# Patient Record
Sex: FEMALE | Race: OTHER | HISPANIC OR LATINO
[De-identification: names, ages, dates, MRNs, and addresses within clinical notes are randomized per-mention and may not be internally consistent; named-entity substitution may affect disease eponyms.]

---

## 2023-10-16 ENCOUNTER — APPOINTMENT (OUTPATIENT)
Dept: ORTHOPEDIC SURGERY | Facility: CLINIC | Age: 48
End: 2023-10-16
Payer: COMMERCIAL

## 2023-10-16 VITALS — BODY MASS INDEX: 27.46 KG/M2 | WEIGHT: 155 LBS | HEIGHT: 63 IN

## 2023-10-16 DIAGNOSIS — Z78.9 OTHER SPECIFIED HEALTH STATUS: ICD-10-CM

## 2023-10-16 PROCEDURE — 99204 OFFICE O/P NEW MOD 45 MIN: CPT

## 2023-10-16 PROCEDURE — 73562 X-RAY EXAM OF KNEE 3: CPT | Mod: 50

## 2023-10-19 ENCOUNTER — APPOINTMENT (OUTPATIENT)
Dept: ORTHOPEDIC SURGERY | Facility: CLINIC | Age: 48
End: 2023-10-19
Payer: COMMERCIAL

## 2023-10-19 PROCEDURE — 99214 OFFICE O/P EST MOD 30 MIN: CPT

## 2023-10-19 PROCEDURE — 72170 X-RAY EXAM OF PELVIS: CPT

## 2023-10-19 PROCEDURE — 72110 X-RAY EXAM L-2 SPINE 4/>VWS: CPT

## 2023-10-30 ENCOUNTER — APPOINTMENT (OUTPATIENT)
Dept: ORTHOPEDIC SURGERY | Facility: CLINIC | Age: 48
End: 2023-10-30
Payer: COMMERCIAL

## 2023-10-30 PROCEDURE — 99214 OFFICE O/P EST MOD 30 MIN: CPT

## 2023-10-30 PROCEDURE — 95864 NEEDLE EMG 4 EXTREMITIES: CPT

## 2023-10-30 PROCEDURE — 73110 X-RAY EXAM OF WRIST: CPT | Mod: 50

## 2023-11-08 ENCOUNTER — APPOINTMENT (OUTPATIENT)
Dept: NEUROLOGY | Facility: CLINIC | Age: 48
End: 2023-11-08
Payer: COMMERCIAL

## 2023-11-08 PROCEDURE — 95886 MUSC TEST DONE W/N TEST COMP: CPT

## 2023-11-08 PROCEDURE — 95912 NRV CNDJ TEST 11-12 STUDIES: CPT

## 2023-11-10 ENCOUNTER — APPOINTMENT (OUTPATIENT)
Dept: ORTHOPEDIC SURGERY | Facility: CLINIC | Age: 48
End: 2023-11-10
Payer: COMMERCIAL

## 2023-11-10 VITALS — WEIGHT: 155 LBS | BODY MASS INDEX: 27.46 KG/M2 | HEIGHT: 63 IN

## 2023-11-10 DIAGNOSIS — M25.532 PAIN IN RIGHT WRIST: ICD-10-CM

## 2023-11-10 DIAGNOSIS — M25.531 PAIN IN RIGHT WRIST: ICD-10-CM

## 2023-11-10 DIAGNOSIS — Z86.018 PERSONAL HISTORY OF OTHER BENIGN NEOPLASM: ICD-10-CM

## 2023-11-10 PROCEDURE — 72050 X-RAY EXAM NECK SPINE 4/5VWS: CPT

## 2023-11-10 PROCEDURE — 99214 OFFICE O/P EST MOD 30 MIN: CPT

## 2023-11-10 RX ORDER — METHYLPREDNISOLONE 4 MG/1
4 TABLET ORAL
Qty: 1 | Refills: 0 | Status: ACTIVE | COMMUNITY
Start: 2023-11-10 | End: 1900-01-01

## 2023-11-15 ENCOUNTER — APPOINTMENT (OUTPATIENT)
Dept: MRI IMAGING | Facility: CLINIC | Age: 48
End: 2023-11-15
Payer: COMMERCIAL

## 2023-11-15 PROCEDURE — 73221 MRI JOINT UPR EXTREM W/O DYE: CPT | Mod: RT

## 2023-11-15 PROCEDURE — 72141 MRI NECK SPINE W/O DYE: CPT

## 2023-11-20 ENCOUNTER — APPOINTMENT (OUTPATIENT)
Dept: ORTHOPEDIC SURGERY | Facility: CLINIC | Age: 48
End: 2023-11-20
Payer: COMMERCIAL

## 2023-11-20 DIAGNOSIS — R20.0 ANESTHESIA OF SKIN: ICD-10-CM

## 2023-11-20 PROCEDURE — 99213 OFFICE O/P EST LOW 20 MIN: CPT

## 2023-11-29 ENCOUNTER — APPOINTMENT (OUTPATIENT)
Dept: ORTHOPEDIC SURGERY | Facility: CLINIC | Age: 48
End: 2023-11-29
Payer: COMMERCIAL

## 2023-11-29 VITALS — BODY MASS INDEX: 27.46 KG/M2 | WEIGHT: 155 LBS | HEIGHT: 63 IN

## 2023-11-29 DIAGNOSIS — S63.501D UNSPECIFIED SPRAIN OF RIGHT WRIST, SUBSEQUENT ENCOUNTER: ICD-10-CM

## 2023-11-29 PROCEDURE — 99214 OFFICE O/P EST MOD 30 MIN: CPT

## 2023-11-30 ENCOUNTER — APPOINTMENT (OUTPATIENT)
Dept: ORTHOPEDIC SURGERY | Facility: CLINIC | Age: 48
End: 2023-11-30

## 2023-12-18 ENCOUNTER — APPOINTMENT (OUTPATIENT)
Dept: ORTHOPEDIC SURGERY | Facility: CLINIC | Age: 48
End: 2023-12-18
Payer: COMMERCIAL

## 2023-12-18 VITALS — HEIGHT: 63 IN | WEIGHT: 155 LBS | BODY MASS INDEX: 27.46 KG/M2

## 2023-12-18 DIAGNOSIS — M22.42 CHONDROMALACIA PATELLAE, LEFT KNEE: ICD-10-CM

## 2023-12-18 DIAGNOSIS — M22.41 CHONDROMALACIA PATELLAE, RIGHT KNEE: ICD-10-CM

## 2023-12-18 PROCEDURE — 99213 OFFICE O/P EST LOW 20 MIN: CPT

## 2023-12-18 NOTE — IMAGING
[Bilateral] : knee bilaterally [All Views] : anteroposterior, lateral, skyline, and anteroposterior standing [Mild patellofemoral OA] : Mild patellofemoral OA [de-identified] :   ----------------------------------------------------------------------------   Bilateral knee exam:   Skin: no significant pertinent finding Inspection:      (neg) Effusion      (neg) Malalignment      (neg) Swelling      (neg) Quad atrophy      (neg) J-sign ROM:      0 - 125 degrees of flexion. Tenderness:      (+) MJLT      (neg) LJLT      (+) Medial patellar facet tenderness      (+) Lateral patellar facet tenderness      (neg) Crepitus      (neg) Patellar grind tenderness      (+) Patellar tendon      (neg) Quad tendon      Other: Stability:      (neg) Lachman      (neg) Varus/Valgus instability      (neg) Posterior drawer      (neg) Patellar translation: wnl Additional tests:      (neg) McMurrays test      (neg) Patellar apprehension      Other: Strength: 5/5 Q/H/TA/GS/EHL Neuro: In tact to light touch throughout, DTR's wnl Vascularity: Extremity warm and well perfused Gait: normal

## 2023-12-18 NOTE — DISCUSSION/SUMMARY
[de-identified] : Discussed options, HEP avoid leg extensions Continue with PT ----------------------------------------------------------------------------  All relevant imaging studies pertinent to today's visit, including x-rays, MRI's and/or other advanced imaging studies (CT/etc) were independently interpreted and reviewed with the patient as needed. Implications of the studies together with the patient's clinical picture were discussed to formulate a working diagnosis and management options were detailed.  The patient was advised of the diagnosis.  The natural history of the pathology was explained in full. All questions were answered.  The risks and benefits of conservative and interventional treatment alternatives were explained to the patient

## 2023-12-18 NOTE — REASON FOR VISIT
[FreeTextEntry2] : follow up bilateral knees, has been feeling a little better, , right knee has ha some pain with stairsgoing to PT

## 2023-12-18 NOTE — HISTORY OF PRESENT ILLNESS
[0] : 0 [2] : 2 [Dull/Aching] : dull/aching [Localized] : localized [Sharp] : sharp [Frequent] : frequent [Work] : work [Rest] : rest [Stairs] : stairs [de-identified] : This is Ms. JEANETTE CUNNINGHAM  a 48 year old female who comes in today complaining of Bilateral knees. Patient states about a week ago she started feeling pain in Both knee but the right knee hurts more than the left knee. Patient states it hurts more when she is going downstairs.     hx of acid reflux [] : no [FreeTextEntry1] : Bilateral knees [FreeTextEntry9] : keeping leg straight [de-identified] : driving  [de-identified] : physical therapy

## 2023-12-27 ENCOUNTER — APPOINTMENT (OUTPATIENT)
Dept: PAIN MANAGEMENT | Facility: CLINIC | Age: 48
End: 2023-12-27
Payer: COMMERCIAL

## 2023-12-27 VITALS — BODY MASS INDEX: 27.46 KG/M2 | HEIGHT: 63 IN | WEIGHT: 155 LBS

## 2023-12-27 PROCEDURE — 99204 OFFICE O/P NEW MOD 45 MIN: CPT

## 2023-12-27 NOTE — DATA REVIEWED
[Cervical Spine] : cervical spine [MRI] : MRI [Report was reviewed and noted in the chart] : The report was reviewed and noted in the chart [I independently reviewed and interpreted images and report] : I independently reviewed and interpreted images and report [FreeTextEntry1] : 11/15/2023: C3-C4: LEFT paracentral disc herniation impinging the cord, LEFT NF narrowing and impingement on the LEFT C4 nerve root C4-C5: broad-based herniation, impinging on the cord to the left, causing BILATERAL NF narrowing and impinging on the bilateral C5 nerve roots C5-C6: disc bulging, RIGHT foraminal herniation impinging on the RIGHT C6 nerve root with RIGHT NF narrowing C6-C7: disc bulging, broad-based disc herniation asymmetric to the LEFT impinging on the LEFT C7 nerve root with LEFT > right NF narrowing

## 2023-12-27 NOTE — PHYSICAL EXAM
[de-identified] : Gen: NAD Head: NC/AT Neck: +spurling's on the RIGHT, limited rotation to the right Eyes: no glasses, no scleral icterus ENT: mucous membranes moist CV: RRR, S1 S2, no mrg Lungs: CTAB, nonlabored breathing Abd: soft, NT/ND Ext: full ROM in all extremities, no peripheral edema Neuro: CN intact; decreased sensation over C7 on the left UEs +5 L +5 R shoulder abduction +5 L +5 R arm abduction +5 L +5 R forearm flexion +5 L +5 R forearm extension +5 L +5 R finger flexion +5 L +5 R  strength Psych: normal affect Skin: no visible lesions

## 2023-12-27 NOTE — HISTORY OF PRESENT ILLNESS
[FreeTextEntry1] : 12/27/2023: JEANETTE CUNNINGHAM is a 48 year-old woman presenting for a NPV for a history of chronic neck pain with radicular features. The patient notes that she has been having pain in the neck for the past 10 years. 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 At this point, the patient notes having some aching pain in the posterior neck to the right axilla, posterolateral arm and dorsal forearm and into the right hand to the 3rd-5th digits. She notes intermittent numbness and tingling over this distribution. Pain is worse with cervical rotation to the right.  The patient states that average pain over the past week was 8/10 in severity. Mood: Patient denies depression or anxiety.  Sleep: Patient notes difficulty with sleep initiation and maintenance 2/2 pain.  [Neck] : neck [10] : 10 [6] : 6 [Radiating] : radiating [Constant] : constant [Work] : work [Sleep] : sleep [Ice] : ice [Sitting] : sitting [Standing] : standing [Walking] : walking [Bending forward] : bending forward [Full time] : Work status: full time [] : no [FreeTextEntry7] : b/l shoulder and the right arm hurts more  [de-identified] : lifting  [de-identified] : MRI C SPINE 11/2023 PACS

## 2024-01-02 ENCOUNTER — APPOINTMENT (OUTPATIENT)
Dept: ORTHOPEDIC SURGERY | Facility: CLINIC | Age: 49
End: 2024-01-02
Payer: COMMERCIAL

## 2024-01-02 PROCEDURE — 99213 OFFICE O/P EST LOW 20 MIN: CPT

## 2024-01-02 NOTE — ASSESSMENT
[FreeTextEntry1] : The condition was explained to the patient. Discussed that the natural history of epicondylitis is self-limited and most cases resolve by 1 year. Recommend symptomatic treatment in the interim - activity modification, ice, NSAID, brace, PT, steroid vs PRP injection. Can consider surgery if conservative management fails and symptoms persist >1y.  - MRI bilateral elbows. - prescribed PT for bilateral elbows. - reviewed activity/posture modification and night splint for CuTS.  F/u after MRI.

## 2024-01-02 NOTE — HISTORY OF PRESENT ILLNESS
[de-identified] : 1/2/24: f/u bilateral hand numbness. alternates between RIGHT and LEFT cubital tunnel night splint. c/o constant numbness of RIGHT > LEFT middle/ring/small fingers, sometimes affects other digits. NEW c/o RIGHT > LEFT lateral elbow pain x 2 weeks. Denies injury. Wearing RIGHT heelbo elbow pad for work because it hurts to lean her RIGHT elbow against the arm rest. Reports that her arm feels like it's being strangled, and it feels better temporarily to raise her arm above her head. Since last visit, saw Dr. Jaramillo for neck => referred to Pain Management for BRIELLE. Also recommend Rheumatology evaluation (scheduled for 3/19/23). Saw Dr. Santos => scheduled for C7-T1 BRIELLE on 1/9/24. Rx Celecoxib. Attended Acupuncture x 1, which improved her headache.  11/20/23: c/o diffuse pain in arms, knees, and abdomen today. c/o cramping pain from RIGHT ulnar hand up forearm. pain worse when leaning on forearm. wearing RIGHT cubital tunnel night splint, unable to tolerate bilateral splints. - f/u EDX. f/u BILATERAL hand pain/numbness. Since last visit, saw Dr. Jaramillo => ordered MRI c-spine and R wrist. prescribed MDP - patient reports no improvement.  EDX 11/8/23 - IMPRESSION: mild chronic bilateral C5/6/7 radiculopathy.  MRI R wrist 11/15/23  1. Partial tearing of the scapholunate ligament at the lunate attachment with surrounding synovitis, slight effusion and dorsal capsulitis suggesting recent wrist sprain without malalignment or ganglion. 2. No scaphoid fracture. 3. Mild flexor carpi radialis tenosynovitis.  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 c/o forearm pain when leaning on it. Also c/o intermittent numbness of RIGHT > LEFT middle, ring, small fingers. Occurs with activity - at work and ADLs. No formal tx to date. Has difficulty with gripping for long periods. Does not wake her from sleep. She has tried bracing, icing, and Aleve with some relief.  Hx: GERD. [FreeTextEntry1] : BILATERAL hand  [FreeTextEntry5] : JEANETTE is here today to follow up on her BILATERAL hand and elbow symptoms. pt states there are no changes in their symptoms since the last visit. taking muscle relaxer with minimal relief. wearing night splint, worsens pain at times.

## 2024-01-05 ENCOUNTER — APPOINTMENT (OUTPATIENT)
Dept: PAIN MANAGEMENT | Facility: CLINIC | Age: 49
End: 2024-01-05

## 2024-01-05 ENCOUNTER — APPOINTMENT (OUTPATIENT)
Dept: MRI IMAGING | Facility: CLINIC | Age: 49
End: 2024-01-05
Payer: COMMERCIAL

## 2024-01-05 PROCEDURE — 73221 MRI JOINT UPR EXTREM W/O DYE: CPT | Mod: LT

## 2024-01-09 ENCOUNTER — APPOINTMENT (OUTPATIENT)
Dept: PAIN MANAGEMENT | Facility: CLINIC | Age: 49
End: 2024-01-09
Payer: COMMERCIAL

## 2024-01-09 PROCEDURE — 62321 NJX INTERLAMINAR CRV/THRC: CPT

## 2024-01-23 ENCOUNTER — APPOINTMENT (OUTPATIENT)
Dept: ORTHOPEDIC SURGERY | Facility: CLINIC | Age: 49
End: 2024-01-23
Payer: COMMERCIAL

## 2024-01-23 DIAGNOSIS — M77.12 LATERAL EPICONDYLITIS, LEFT ELBOW: ICD-10-CM

## 2024-01-23 DIAGNOSIS — G56.21 LESION OF ULNAR NERVE, RIGHT UPPER LIMB: ICD-10-CM

## 2024-01-23 DIAGNOSIS — G56.22 LESION OF ULNAR NERVE, LEFT UPPER LIMB: ICD-10-CM

## 2024-01-23 DIAGNOSIS — M77.11 LATERAL EPICONDYLITIS, RIGHT ELBOW: ICD-10-CM

## 2024-01-23 PROCEDURE — 99213 OFFICE O/P EST LOW 20 MIN: CPT

## 2024-01-23 NOTE — HISTORY OF PRESENT ILLNESS
[FreeTextEntry1] : BILATERAL elbow  [de-identified] : 1/23/24:  - f/u MRI bilateral elbows. f/u bilateral lateral epicondylitis. - f/u bilateral hand numbness. bilateral CuTS. wearing night splint. reports RIGHT >> LEFT ring/small finger numbness, constant on RIGHT and intermittent on LEFT. s/p C7-T1 BRIELLE on 1/9/24. Reports no change in UE symptoms. Attended 1st PT session last week. Feels better after resuming workouts at the gym last week.  MRI L elbow: 1. Mild-grade lateral epicondylitis without extensor tendon tear. 2. No acute fracture, malalignment, ligament tear, effusion, or loose body.  MRI R elbow 1/5/24 - IMPRESSION: 1. Lateral epicondylitis with mild partial tearing and delamination at the common extensor tendon insertion without retraction or atrophy. 2. Slight effusion and synovitis with small lateral synovial plica without fracture, malalignment, ligament tear or loose body.  1/2/24: f/u bilateral hand numbness. alternates between RIGHT and LEFT cubital tunnel night splint. c/o constant numbness of RIGHT > LEFT middle/ring/small fingers, sometimes affects other digits. NEW c/o RIGHT > LEFT lateral elbow pain x 2 weeks. Denies injury. Wearing RIGHT heelbo elbow pad for work because it hurts to lean her RIGHT elbow against the arm rest. Reports that her arm feels like it's being strangled, and it feels better temporarily to raise her arm above her head. Since last visit, saw Dr. Jaramillo for neck => referred to Pain Management for BRIELLE. Also recommend Rheumatology evaluation (scheduled for 3/19/23). Saw Dr. Santos => scheduled for C7-T1 BRIELLE on 1/9/24. Rx Celecoxib. Attended Acupuncture x 1, which improved her headache.  11/20/23: c/o diffuse pain in arms, knees, and abdomen today. c/o cramping pain from RIGHT ulnar hand up forearm. pain worse when leaning on forearm. wearing RIGHT cubital tunnel night splint, unable to tolerate bilateral splints. - f/u EDX. f/u BILATERAL hand pain/numbness. Since last visit, saw Dr. Jaramillo => ordered MRI c-spine and R wrist. prescribed MDP - patient reports no improvement.  EDX 11/8/23 - IMPRESSION: mild chronic bilateral C5/6/7 radiculopathy.  MRI R wrist 11/15/23  1. Partial tearing of the scapholunate ligament at the lunate attachment with surrounding synovitis, slight effusion and dorsal capsulitis suggesting recent wrist sprain without malalignment or ganglion. 2. No scaphoid fracture. 3. Mild flexor carpi radialis tenosynovitis.  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 c/o forearm pain when leaning on it. Also c/o intermittent numbness of RIGHT > LEFT middle, ring, small fingers. Occurs with activity - at work and ADLs. No formal tx to date. Has difficulty with gripping for long periods. Does not wake her from sleep. She has tried bracing, icing, and Aleve with some relief.  Hx: GERD. [FreeTextEntry5] : JEANETTE is here today for BILATERAL elbow MRI results. Patient reports no changes in elbow since last visit. Patient has been using night splint and started PT.

## 2024-01-23 NOTE — IMAGING
[de-identified] : LEFT HAND skin intact. no swelling. TTP to lateral epicondyle > cubital tunnel. good elbow extension, flexion. good pronation, supination. wrist ROM: good extension, flexion. good EPL, FPL. good finger extension, flex to full fist. good finger abduction and adduction.  SILT to median, ulnar, radial distributions. palpable radial pulse, brisk cap refill all digits.  4+/5, 1st DI 5/5, APB 4/5. no triggering. negative Tinel's at carpal tunnel. no ulnar nerve subluxation at the elbow. Tinel's at cubital tunnel => pain at elbow, no N/T radiating to digits. + elbow pain with resisted wrist extension.   RIGHT HAND skin intact. no swelling. TTP to lateral epicondyle > cubital tunnel. good elbow extension, flexion. good pronation, supination. wrist ROM: good extension, flexion. good EPL, FPL. good finger extension, flex to full fist. good finger abduction and adduction.  numbness of RF/SF and dorsal ulnar hand. SILT to other digits, dorsal 1st webspace. palpable radial pulse, brisk cap refill all digits.  4+/5, 1st DI 4+/5, APB 5/5. strength improves with repeated testing. no triggering. negative Tinel's at carpal tunnel. no ulnar nerve subluxation at the elbow. Tinel's at cubital tunnel => pain at elbow, no N/T radiating to digits. + elbow pain with resisted wrist extension.

## 2024-01-23 NOTE — ASSESSMENT
[FreeTextEntry1] : MRI bilateral elbows reviewed with patient. The condition was explained to the patient.  - wrist brace for sleep and PRN lifting activity. - continue PT for bilateral elbows. - continue posture/activity modification and night splint for CuTS.  F/u 6 weeks.

## 2024-01-24 ENCOUNTER — APPOINTMENT (OUTPATIENT)
Dept: PAIN MANAGEMENT | Facility: CLINIC | Age: 49
End: 2024-01-24
Payer: COMMERCIAL

## 2024-01-24 VITALS — WEIGHT: 155 LBS | HEIGHT: 63 IN | BODY MASS INDEX: 27.46 KG/M2

## 2024-01-24 PROCEDURE — 20552 NJX 1/MLT TRIGGER POINT 1/2: CPT

## 2024-01-24 PROCEDURE — 99214 OFFICE O/P EST MOD 30 MIN: CPT | Mod: 25

## 2024-01-24 RX ORDER — CELECOXIB 200 MG/1
200 CAPSULE ORAL TWICE DAILY
Qty: 60 | Refills: 2 | Status: ACTIVE | COMMUNITY
Start: 2023-12-27 | End: 1900-01-01

## 2024-01-24 RX ORDER — CELECOXIB 100 MG/1
100 CAPSULE ORAL TWICE DAILY
Qty: 30 | Refills: 1 | Status: DISCONTINUED | COMMUNITY
Start: 2023-10-16 | End: 2024-01-24

## 2024-01-24 RX ORDER — CYCLOBENZAPRINE HYDROCHLORIDE 5 MG/1
5 TABLET, FILM COATED ORAL TWICE DAILY
Qty: 60 | Refills: 1 | Status: ACTIVE | COMMUNITY
Start: 2024-01-24 | End: 1900-01-01

## 2024-01-24 NOTE — HISTORY OF PRESENT ILLNESS
[Neck] : neck [10] : 10 [6] : 6 [Radiating] : radiating [Constant] : constant [Work] : work [Sleep] : sleep [Ice] : ice [Sitting] : sitting [Walking] : walking [Standing] : standing [Bending forward] : bending forward [Full time] : Work status: full time [FreeTextEntry1] : 1/24/2024: JEANETTE CUNNINGHAM is a 48 year-old woman presenting for a RPV for a history of chronic neck pain with radicular features. The patient underwent a C7-T1 BRIELLE on 1/9/2024. The patient does not note significant improvement of pain in the right posterior neck with radiation to the right upper back between the scapulae. Pain is worse with cervical rotation to the right. No focal numbness or weakness in the upper extremities. No bowel or bladder incontinence or saddle anesthesia. The patient was also diagnosed with lateral epicondylitis on the right. The patient notes having some improvement of pain with celecoxib 200mg BID prn.  The patient states that average pain over the past week was 8/10 in severity. Mood: No depression or anxiety.  Sleep: Patient continues to have difficulty with sleep initiation and maintenance 2/2 pain.   12/27/2023: JEANETTE CUNNINGHAM is a 48 year-old woman presenting for a NPV for a history of chronic neck pain with radicular features. The patient notes that she has been having pain in the neck for the past 10 years. 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 At this point, the patient notes having some aching pain in the posterior neck to the right axilla, posterolateral arm and dorsal forearm and into the right hand to the 3rd-5th digits. She notes intermittent numbness and tingling over this distribution. Pain is worse with cervical rotation to the right.  The patient states that average pain over the past week was 8/10 in severity. Mood: Patient denies depression or anxiety.  Sleep: Patient notes difficulty with sleep initiation and maintenance 2/2 pain.  [] : no [de-identified] : lifting  [FreeTextEntry7] : b/l shoulder and the right arm hurts more  [de-identified] : MRI C SPINE 11/2023 PACS

## 2024-01-24 NOTE — DATA REVIEWED
[MRI] : MRI [Cervical Spine] : cervical spine [Report was reviewed and noted in the chart] : The report was reviewed and noted in the chart [I independently reviewed and interpreted images and report] : I independently reviewed and interpreted images and report [FreeTextEntry1] : 11/15/2023: C3-C4: LEFT paracentral disc herniation impinging the cord, LEFT NF narrowing and impingement on the LEFT C4 nerve root C4-C5: broad-based herniation, impinging on the cord to the left, causing BILATERAL NF narrowing and impinging on the bilateral C5 nerve roots C5-C6: disc bulging, RIGHT foraminal herniation impinging on the RIGHT C6 nerve root with RIGHT NF narrowing C6-C7: disc bulging, broad-based disc herniation asymmetric to the LEFT impinging on the LEFT C7 nerve root with LEFT > right NF narrowing

## 2024-01-24 NOTE — DISCUSSION/SUMMARY
----- Message from Hayden Prieto sent at 5/3/2021 12:16 PM EDT -----  Thresholds essentially unchanged from last audio (November 2020). Some decrease in right sided discrimination. [de-identified] : JEANETTE CUNNINGHAM is a 48 year-old woman presenting for a RPV for a history of chronic neck pain with radicular features.   Prior treatment: 1/9/2024: C7-T1 BRIELLE w/ only short term improvement of pain Cervical JERONIMO w/ significant relief for years Physical therapy Celecoxib prn  Plan: 1) MRI cervical spine images reviewed with the patient. 2) TPI in the upper back. The procedure was explained to the patient in detail. Reviewed risks, benefits, and alternatives with the patient. Some risks discussed included temporary increase in pain, bleeding, infection, and side effects from steroids. The patient expressed understanding and would like to proceed.  3) Refill celecoxib 200mg BID prn; patient denies AEs 4) Trial cyclobenzaprine 5mg BID prn; Discussed AEs, including sedation, dizziness, somnolence. Patient told to use caution when driving after taking the first few doses. 5) Continue OT for elbow pain 6) Continue follow up with Dr. Garcia for upper extremity pain 7) RTC 2 months

## 2024-01-24 NOTE — PHYSICAL EXAM
[de-identified] : Gen: NAD Head: NC/AT Neck: +spurling's on the RIGHT, limited rotation to the right Eyes: no glasses, no scleral icterus ENT: mucous membranes moist CV: RRR, S1 S2, no mrg Lungs: CTAB, nonlabored breathing Abd: soft, NT/ND Ext: full ROM in all extremities, no peripheral edema Neuro: CN intact; decreased sensation over C7 on the left UEs +5 L +5 R shoulder abduction +5 L +5 R arm abduction +5 L +5 R forearm flexion +5 L +5 R forearm extension +5 L +5 R finger flexion +5 L +5 R  strength Psych: normal affect Skin: no visible lesions

## 2024-01-24 NOTE — PROCEDURE
[Bilateral] : bilaterally of the [Cervical paraspinal muscle] : cervical paraspinal muscle [___ cc    1%] : Lidocaine ~Vcc of 1%  [___ cc    10mg] : Triamcinolone (Kenalog) ~Vcc of 10 mg

## 2024-03-05 ENCOUNTER — APPOINTMENT (OUTPATIENT)
Dept: ORTHOPEDIC SURGERY | Facility: CLINIC | Age: 49
End: 2024-03-05

## 2024-03-26 PROBLEM — M47.812 CERVICAL SPONDYLOSIS: Status: ACTIVE | Noted: 2023-11-10

## 2024-03-26 PROBLEM — M54.12 CERVICAL RADICULOPATHY, ACUTE: Status: ACTIVE | Noted: 2023-11-10

## 2024-03-26 PROBLEM — M54.2 MYOFASCIAL NECK PAIN: Status: ACTIVE | Noted: 2024-03-26

## 2024-03-27 ENCOUNTER — APPOINTMENT (OUTPATIENT)
Dept: PAIN MANAGEMENT | Facility: CLINIC | Age: 49
End: 2024-03-27

## 2024-03-27 DIAGNOSIS — M47.812 SPONDYLOSIS W/OUT MYELOPATHY OR RADICULOPATHY, CERVICAL REGION: ICD-10-CM

## 2024-03-27 DIAGNOSIS — M54.12 RADICULOPATHY, CERVICAL REGION: ICD-10-CM

## 2024-03-27 DIAGNOSIS — M54.2 CERVICALGIA: ICD-10-CM

## 2024-03-28 NOTE — PHYSICAL EXAM
[de-identified] : Gen: NAD Head: NC/AT Neck: +spurling's on the RIGHT, limited rotation to the right Eyes: no glasses, no scleral icterus ENT: mucous membranes moist CV: RRR, S1 S2, no mrg Lungs: CTAB, nonlabored breathing Abd: soft, NT/ND Ext: full ROM in all extremities, no peripheral edema Neuro: CN intact; decreased sensation over C7 on the left UEs +5 L +5 R shoulder abduction +5 L +5 R arm abduction +5 L +5 R forearm flexion +5 L +5 R forearm extension +5 L +5 R finger flexion +5 L +5 R  strength Psych: normal affect Skin: no visible lesions

## 2024-03-28 NOTE — HISTORY OF PRESENT ILLNESS
[Neck] : neck [10] : 10 [6] : 6 [Radiating] : radiating [Work] : work [Constant] : constant [Ice] : ice [Sleep] : sleep [Sitting] : sitting [Bending forward] : bending forward [Walking] : walking [Standing] : standing [Full time] : Work status: full time [FreeTextEntry1] : 3/27/2024: JEANETTE CUNNINGHAM is a 48 year-old woman presenting for a RPV for a history of chronic neck pain with radicular features. The patient underwent a TPI in the low cervical/upper thoracic paraspinal muscles at last visit and was started on cyclobenzaprine.    The patient states that average pain over the past week was /10 in severity. Mood: Sleep:  1/24/2024: JEANETTE CUNNINGHAM is a 48 year-old woman presenting for a RPV for a history of chronic neck pain with radicular features. The patient underwent a C7-T1 BRIELLE on 1/9/2024. The patient does not note significant improvement of pain in the right posterior neck with radiation to the right upper back between the scapulae. Pain is worse with cervical rotation to the right. No focal numbness or weakness in the upper extremities. No bowel or bladder incontinence or saddle anesthesia. The patient was also diagnosed with lateral epicondylitis on the right. The patient notes having some improvement of pain with celecoxib 200mg BID prn.  The patient states that average pain over the past week was 8/10 in severity. Mood: No depression or anxiety.  Sleep: Patient continues to have difficulty with sleep initiation and maintenance 2/2 pain.   12/27/2023: JEANETTE CUNNINGHAM is a 48 year-old woman presenting for a NPV for a history of chronic neck pain with radicular features. The patient notes that she has been having pain in the neck for the past 10 years. 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 At this point, the patient notes having some aching pain in the posterior neck to the right axilla, posterolateral arm and dorsal forearm and into the right hand to the 3rd-5th digits. She notes intermittent numbness and tingling over this distribution. Pain is worse with cervical rotation to the right.  The patient states that average pain over the past week was 8/10 in severity. Mood: Patient denies depression or anxiety.  Sleep: Patient notes difficulty with sleep initiation and maintenance 2/2 pain.  [] : no [FreeTextEntry7] : b/l shoulder and the right arm hurts more  [de-identified] : lifting  [de-identified] : MRI C SPINE 11/2023 PACS  Bed/Stretcher in lowest position, wheels locked, appropriate side rails in place/Call bell, personal items and telephone in reach/Instruct patient to call for assistance before getting out of bed/chair/stretcher/Non-slip footwear applied when patient is off stretcher/[place] to call system/Physically safe environment - no spills, clutter or unnecessary equipment/Purposeful proactive rounding/Room/bathroom lighting operational, light cord in reach

## 2024-03-28 NOTE — DISCUSSION/SUMMARY
[de-identified] : JEANETTE CUNNINGHAM is a 48 year-old woman presenting for a RPV for a history of chronic neck pain with radicular features.   Prior treatment: TPI (1/24/2024 w/ steroid) 1/9/2024: C7-T1 BRIELLE w/ only short term improvement of pain Cervical JERONIMO w/ significant relief for years Physical therapy Celecoxib prn  Plan: 1) MRI cervical spine images reviewed with the patient. 2) TPI in the upper back.  3) Refill celecoxib 200mg BID prn; patient denies AEs 4) Trial cyclobenzaprine 5mg BID prn; Discussed AEs, including sedation, dizziness, somnolence. Patient told to use caution when driving after taking the first few doses. 5) Continue OT for elbow pain 6) Continue follow up with Dr. Garcia for upper extremity pain 7) RTC 2 months

## 2024-04-06 ENCOUNTER — OUTPATIENT (OUTPATIENT)
Dept: OUTPATIENT SERVICES | Facility: HOSPITAL | Age: 49
LOS: 1 days | End: 2024-04-06
Payer: COMMERCIAL

## 2024-04-06 ENCOUNTER — APPOINTMENT (OUTPATIENT)
Dept: ULTRASOUND IMAGING | Facility: IMAGING CENTER | Age: 49
End: 2024-04-06

## 2024-04-06 ENCOUNTER — APPOINTMENT (OUTPATIENT)
Dept: MAMMOGRAPHY | Facility: IMAGING CENTER | Age: 49
End: 2024-04-06

## 2024-04-06 ENCOUNTER — APPOINTMENT (OUTPATIENT)
Dept: ULTRASOUND IMAGING | Facility: IMAGING CENTER | Age: 49
End: 2024-04-06
Payer: COMMERCIAL

## 2024-04-06 DIAGNOSIS — Z00.8 ENCOUNTER FOR OTHER GENERAL EXAMINATION: ICD-10-CM

## 2024-04-06 PROCEDURE — 76641 ULTRASOUND BREAST COMPLETE: CPT | Mod: 26,50

## 2024-04-06 PROCEDURE — 76641 ULTRASOUND BREAST COMPLETE: CPT

## 2024-10-08 ENCOUNTER — OUTPATIENT (OUTPATIENT)
Dept: OUTPATIENT SERVICES | Facility: HOSPITAL | Age: 49
LOS: 1 days | End: 2024-10-08
Payer: COMMERCIAL

## 2024-10-08 ENCOUNTER — APPOINTMENT (OUTPATIENT)
Dept: ULTRASOUND IMAGING | Facility: IMAGING CENTER | Age: 49
End: 2024-10-08
Payer: COMMERCIAL

## 2024-10-08 DIAGNOSIS — Z00.8 ENCOUNTER FOR OTHER GENERAL EXAMINATION: ICD-10-CM

## 2024-10-08 PROCEDURE — 76642 ULTRASOUND BREAST LIMITED: CPT

## 2024-10-08 PROCEDURE — 76642 ULTRASOUND BREAST LIMITED: CPT | Mod: 26,50

## 2025-01-21 ENCOUNTER — APPOINTMENT (OUTPATIENT)
Dept: MAMMOGRAPHY | Facility: IMAGING CENTER | Age: 50
End: 2025-01-21

## 2025-01-21 ENCOUNTER — APPOINTMENT (OUTPATIENT)
Dept: ULTRASOUND IMAGING | Facility: IMAGING CENTER | Age: 50
End: 2025-01-21

## 2025-05-19 ENCOUNTER — OUTPATIENT (OUTPATIENT)
Dept: OUTPATIENT SERVICES | Facility: HOSPITAL | Age: 50
LOS: 1 days | End: 2025-05-19

## 2025-05-19 ENCOUNTER — APPOINTMENT (OUTPATIENT)
Dept: ORTHOPEDIC SURGERY | Facility: CLINIC | Age: 50
End: 2025-05-19
Payer: COMMERCIAL

## 2025-05-19 VITALS
TEMPERATURE: 99 F | RESPIRATION RATE: 16 BRPM | DIASTOLIC BLOOD PRESSURE: 72 MMHG | HEIGHT: 62 IN | OXYGEN SATURATION: 98 % | HEART RATE: 59 BPM | WEIGHT: 156.09 LBS | SYSTOLIC BLOOD PRESSURE: 109 MMHG

## 2025-05-19 DIAGNOSIS — Z98.890 OTHER SPECIFIED POSTPROCEDURAL STATES: Chronic | ICD-10-CM

## 2025-05-19 DIAGNOSIS — D48.61 NEOPLASM OF UNCERTAIN BEHAVIOR OF RIGHT BREAST: ICD-10-CM

## 2025-05-19 DIAGNOSIS — M79.18 MYALGIA, OTHER SITE: ICD-10-CM

## 2025-05-19 DIAGNOSIS — M51.26 OTHER INTERVERTEBRAL DISC DISPLACEMENT, LUMBAR REGION: ICD-10-CM

## 2025-05-19 DIAGNOSIS — Z00.00 ENCOUNTER FOR GENERAL ADULT MEDICAL EXAMINATION W/OUT ABNORMAL FINDINGS: ICD-10-CM

## 2025-05-19 PROCEDURE — 99214 OFFICE O/P EST MOD 30 MIN: CPT

## 2025-05-19 PROCEDURE — 72110 X-RAY EXAM L-2 SPINE 4/>VWS: CPT

## 2025-05-19 PROCEDURE — 72170 X-RAY EXAM OF PELVIS: CPT

## 2025-05-19 RX ORDER — GABAPENTIN 300 MG/1
300 CAPSULE ORAL
Qty: 60 | Refills: 1 | Status: ACTIVE | COMMUNITY
Start: 2025-05-19 | End: 1900-01-01

## 2025-05-19 RX ORDER — METHYLPREDNISOLONE 4 MG/1
4 TABLET ORAL
Qty: 1 | Refills: 0 | Status: ACTIVE | COMMUNITY
Start: 2025-05-19 | End: 1900-01-01

## 2025-05-19 NOTE — H&P PST ADULT - NEUROLOGICAL COMMENTS
Hx of cubital tunnel syndrome of upper extremities; new tingling of lower extremities being worked with imaging with orthopedic Hx of cubital tunnel syndrome of upper extremities; new tingling of lower extremities being worked with imaging with orthopedist

## 2025-05-19 NOTE — H&P PST ADULT - PROBLEM SELECTOR PLAN 1
Patient tentatively scheduled for surgery on: 5/30/25  Provided with verbal and written presurgical instructions  Patient instructed to hold NSAIDs, multivitamins and herbal supplements one week prior to surgery    Verbalized understanding  with teach back on the following: Famotidine for GI prophylaxis and chlorhexidine wash    Patient informed surgeon of uterine ablation 5/9/25 Patient tentatively scheduled for surgery on: 5/30/25  Provided with verbal and written presurgical instructions  Patient instructed to hold NSAIDs, multivitamins and herbal supplements one week prior to surgery    Verbalized understanding  with teach back on the following: Famotidine for GI prophylaxis and chlorhexidine wash    Patient informed surgeon of uterine ablation on 5/9/25

## 2025-05-19 NOTE — H&P PST ADULT - HISTORY OF PRESENT ILLNESS
49 yr old female presents with papilloma of right breast noted on mammogram, scheduled for right lumpectomy post verito placement

## 2025-05-19 NOTE — H&P PST ADULT - NSANTHOSAYNRD_GEN_A_CORE
No. HAMILTON screening performed.  STOP BANG Legend: 0-2 = LOW Risk; 3-4 = INTERMEDIATE Risk; 5-8 = HIGH Risk

## 2025-05-23 ENCOUNTER — APPOINTMENT (OUTPATIENT)
Dept: MAMMOGRAPHY | Facility: CLINIC | Age: 50
End: 2025-05-23
Payer: COMMERCIAL

## 2025-05-23 ENCOUNTER — OUTPATIENT (OUTPATIENT)
Dept: OUTPATIENT SERVICES | Facility: HOSPITAL | Age: 50
LOS: 1 days | End: 2025-05-23
Payer: COMMERCIAL

## 2025-05-23 DIAGNOSIS — Z00.8 ENCOUNTER FOR OTHER GENERAL EXAMINATION: ICD-10-CM

## 2025-05-23 PROCEDURE — C1739: CPT

## 2025-05-23 PROCEDURE — 19281 PERQ DEVICE BREAST 1ST IMAG: CPT | Mod: RT

## 2025-05-23 PROCEDURE — 19281 PERQ DEVICE BREAST 1ST IMAG: CPT

## 2025-05-30 ENCOUNTER — APPOINTMENT (OUTPATIENT)
Dept: MAMMOGRAPHY | Facility: IMAGING CENTER | Age: 50
End: 2025-05-30

## 2025-05-30 ENCOUNTER — TRANSCRIPTION ENCOUNTER (OUTPATIENT)
Age: 50
End: 2025-05-30

## 2025-05-30 ENCOUNTER — OUTPATIENT (OUTPATIENT)
Dept: OUTPATIENT SERVICES | Facility: HOSPITAL | Age: 50
LOS: 1 days | End: 2025-05-30
Payer: COMMERCIAL

## 2025-05-30 VITALS
SYSTOLIC BLOOD PRESSURE: 106 MMHG | HEIGHT: 62 IN | OXYGEN SATURATION: 99 % | TEMPERATURE: 97 F | HEART RATE: 80 BPM | WEIGHT: 156.09 LBS | DIASTOLIC BLOOD PRESSURE: 88 MMHG | RESPIRATION RATE: 18 BRPM

## 2025-05-30 VITALS
HEART RATE: 72 BPM | TEMPERATURE: 97 F | DIASTOLIC BLOOD PRESSURE: 78 MMHG | OXYGEN SATURATION: 100 % | SYSTOLIC BLOOD PRESSURE: 123 MMHG | RESPIRATION RATE: 16 BRPM

## 2025-05-30 DIAGNOSIS — Z98.890 OTHER SPECIFIED POSTPROCEDURAL STATES: Chronic | ICD-10-CM

## 2025-05-30 DIAGNOSIS — Z00.8 ENCOUNTER FOR OTHER GENERAL EXAMINATION: ICD-10-CM

## 2025-05-30 DIAGNOSIS — D48.61 NEOPLASM OF UNCERTAIN BEHAVIOR OF RIGHT BREAST: ICD-10-CM

## 2025-05-30 PROCEDURE — 76098 X-RAY EXAM SURGICAL SPECIMEN: CPT

## 2025-05-30 PROCEDURE — 76098 X-RAY EXAM SURGICAL SPECIMEN: CPT | Mod: 26

## 2025-05-30 RX ORDER — DOCUSATE SODIUM 100 MG
1 CAPSULE ORAL
Refills: 0 | DISCHARGE

## 2025-05-30 RX ORDER — KETOROLAC TROMETHAMINE 30 MG/ML
1 INJECTION, SOLUTION INTRAMUSCULAR; INTRAVENOUS
Refills: 0 | DISCHARGE

## 2025-05-30 RX ORDER — OXYCODONE HYDROCHLORIDE AND ACETAMINOPHEN 10; 325 MG/1; MG/1
1 TABLET ORAL
Refills: 0 | DISCHARGE

## 2025-05-30 RX ORDER — IBUPROFEN 200 MG
2 TABLET ORAL
Refills: 0 | DISCHARGE

## 2025-05-30 NOTE — ASU DISCHARGE PLAN (ADULT/PEDIATRIC) - NS MD DC FALL RISK RISK
For information on Fall & Injury Prevention, visit: [email or web address] OR  [email or web address] OR  https://www.cdc.gov/steadi/patient.html

## 2025-05-30 NOTE — ASU DISCHARGE PLAN (ADULT/PEDIATRIC) - ASU DC SPECIAL INSTRUCTIONSFT
You may take tylenol and motrin for pain control alternating every 3 hours. You may shower in 2 days. Let warm, soapy water wash over the incision. Do not scrub hard. When you are not showering, please wear the surgical bra. No heavy lifting greater than 15lbs for at least one month.    Follow up outpatient with Dr. Stokes in 1-2 weeks.

## 2025-05-30 NOTE — ASU PREOP CHECKLIST - ASSESSMENT, HISTORY & PHYSICAL COMPLETED AND ON MEDICAL RECORD
Patient is alert and oriented x3; withdrawn,tired; states \"its hard to do things when you're depressed. \" c/o throat pain; does not want to try any numbing sprays. Voiding  Had one unwitnessed BM this AM; states \"its only after the suppository. \"  Sharon Susan IV or PO as ordered and tolerated  - Nasogastric tube to low intermittent suction as ordered  - Evaluate effectiveness of ordered antiemetic medications  - Provide nonpharmacologic comfort measures as appropriate  - Advance diet as tolerated, if ordered  - Establish a toileting routine/schedule  - Consider collaborating with pharmacy to review patient's medication profile  Outcome: Progressing     Problem: GASTROINTESTINAL - ADULT  Goal: Maintains adequate nutritional intake (undernourished)  Description: IN done

## 2025-05-30 NOTE — ASU DISCHARGE PLAN (ADULT/PEDIATRIC) - CARE PROVIDER_API CALL
Divya [address], [address]  [place] [address]  Phone: [phone]  Fax: [phone]  Established Patient  Follow Up Time: 2 weeks

## 2025-05-30 NOTE — BRIEF OPERATIVE NOTE - OPERATION/FINDINGS
Made a circumareolar incision at the superior aspect of the right areola. Patient had significantly dense breast tissue. Dissected down to the level of the verito seed and removed the tissue. The specimen was sent to radiology to confirm the removal of 2 clips and the verito seed. The breast was then closed with 3-0 vicryl and 4-0 monocryl.

## 2025-05-30 NOTE — ASU DISCHARGE PLAN (ADULT/PEDIATRIC) - FINANCIAL ASSISTANCE
[facility] provides services at a reduced cost to those who are determined to be eligible through [facility]’s financial assistance program. Information regarding [facility]’s financial assistance program can be found by going to [email or web address] or by calling [phone].

## 2025-06-04 LAB — SURGICAL PATHOLOGY STUDY: SIGNIFICANT CHANGE UP

## 2025-07-11 ENCOUNTER — APPOINTMENT (OUTPATIENT)
Dept: ORTHOPEDIC SURGERY | Facility: CLINIC | Age: 50
End: 2025-07-11
Payer: COMMERCIAL

## 2025-07-11 VITALS — BODY MASS INDEX: 27.46 KG/M2 | WEIGHT: 155 LBS | HEIGHT: 63 IN

## 2025-07-11 PROCEDURE — 99214 OFFICE O/P EST MOD 30 MIN: CPT

## 2025-09-12 ENCOUNTER — APPOINTMENT (OUTPATIENT)
Dept: ULTRASOUND IMAGING | Facility: IMAGING CENTER | Age: 50
End: 2025-09-12

## 2025-09-12 ENCOUNTER — APPOINTMENT (OUTPATIENT)
Dept: MAMMOGRAPHY | Facility: IMAGING CENTER | Age: 50
End: 2025-09-12

## (undated) DEVICE — STAPLER SKIN VISI-STAT 35 WIDE

## (undated) DEVICE — ELCTR BOVIE TIP BLADE INSULATED 4" EDGE

## (undated) DEVICE — SPECIMEN CONTAINER 100ML

## (undated) DEVICE — DRSG STERISTRIPS 0.5 X 4"

## (undated) DEVICE — ELCTR ROCKER SWITCH PENCIL BLUE 10FT

## (undated) DEVICE — GOWN XL

## (undated) DEVICE — BLADE PHOTON W ADAPTIVE SMOKE EVACUATION

## (undated) DEVICE — DRSG TEGADERM 6 X 8"

## (undated) DEVICE — DRSG OPSITE 13.75 X 4"

## (undated) DEVICE — BLADE SURGICAL #15 CARBON

## (undated) DEVICE — LIGASURE SMALL JAW

## (undated) DEVICE — PREP CHLORAPREP HI-LITE ORANGE 26ML

## (undated) DEVICE — SAVI SCOUT HANDPIECE

## (undated) DEVICE — DRAIN BLAKE 15FR BARD CHANNEL

## (undated) DEVICE — SOL IRR POUR NS 0.9% 500ML

## (undated) DEVICE — DRSG CURITY GAUZE SPONGE 4 X 4" 12-PLY

## (undated) DEVICE — DRAPE 3/4 SHEET 52X76"

## (undated) DEVICE — GLV 6.5 PROTEXIS (WHITE)

## (undated) DEVICE — DRSG OPSITE 2.5 X 2"

## (undated) DEVICE — PACK MINOR NO DRAPE

## (undated) DEVICE — DRAPE TOWEL BLUE 17" X 24"

## (undated) DEVICE — DRAPE 1/2 SHEET 40X57"

## (undated) DEVICE — DRAIN JACKSON PRATT 10MM FLAT FULL NO TROCAR

## (undated) DEVICE — FOLEY TRAY 16FR 5CC LTX UMETER CLOSED

## (undated) DEVICE — MARKING PEN W RULER

## (undated) DEVICE — POSITIONER FOAM EGG CRATE ULNAR 2PCS (PINK)

## (undated) DEVICE — DRAPE LAPAROTOMY TRANSVERSE

## (undated) DEVICE — SUCTION YANKAUER NO CONTROL VENT

## (undated) DEVICE — DRSG DERMABOND 0.7ML

## (undated) DEVICE — VENODYNE/SCD SLEEVE CALF MEDIUM

## (undated) DEVICE — DRAPE COVER SLEEVE GAMMA FINDER III

## (undated) DEVICE — SYR ASEPTO

## (undated) DEVICE — SOL IRR POUR H2O 500ML

## (undated) DEVICE — SUT VICRYL 4-0 27" SH UNDYED

## (undated) DEVICE — DRAPE INSTRUMENT POUCH 6.75" X 11"

## (undated) DEVICE — ELCTR GROUNDING PAD ADULT COVIDIEN

## (undated) DEVICE — WARMING BLANKET LOWER ADULT

## (undated) DEVICE — DRAPE MAYO STAND 30"

## (undated) DEVICE — DRAIN RESERVOIR FOR JACKSON PRATT 100CC CARDINAL

## (undated) DEVICE — SUT SILK 2-0 18" FS